# Patient Record
Sex: FEMALE | Race: AMERICAN INDIAN OR ALASKA NATIVE
[De-identification: names, ages, dates, MRNs, and addresses within clinical notes are randomized per-mention and may not be internally consistent; named-entity substitution may affect disease eponyms.]

---

## 2017-07-16 ENCOUNTER — HOSPITAL ENCOUNTER (EMERGENCY)
Dept: HOSPITAL 5 - ED | Age: 67
Discharge: HOME | End: 2017-07-16
Payer: MEDICARE

## 2017-07-16 VITALS — DIASTOLIC BLOOD PRESSURE: 92 MMHG | SYSTOLIC BLOOD PRESSURE: 177 MMHG

## 2017-07-16 DIAGNOSIS — F17.200: ICD-10-CM

## 2017-07-16 DIAGNOSIS — M25.552: ICD-10-CM

## 2017-07-16 DIAGNOSIS — I10: ICD-10-CM
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
## 2017-07-16 DIAGNOSIS — M25.462: Primary | ICD-10-CM
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
## 2017-07-16 PROCEDURE — 72100 X-RAY EXAM L-S SPINE 2/3 VWS: CPT

## 2017-07-16 NOTE — XRAY REPORT
FINAL REPORT



PROCEDURE:  XR FEMUR 2 LT



TECHNIQUE:  Four views left femur



HISTORY:  leg pain, fall 



COMPARISON:  No prior studies are available for comparison.



FINDINGS:  

Prior surgical change at the proximal left femur with lateral

compression plate and 6 screws. Moderate degenerative changes of

the left hip. Moderate to severe degenerative changes of the left

knee evident. Large suprapatellar effusion. The superior inferior

pole patellar osteophytic projections.



IMPRESSION:  

No acute fracture seen
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
## 2017-07-16 NOTE — XRAY REPORT
LUMBOSACRAL SPINE, 3 VIEWS:



History: Back pain



Findings: The vertebral bodies, disk spaces and posterior elements are 

intact.  No compression deformity or malalignment.  The SI joints are 

symmetric and unremarkable. There is moderate multilevel degenerative 

disc disease and facet arthropathy. 7 cm calcified uterine fibroid is 

noted.



Impression:

Lumbar spondylosis. No evidence for acute injury to the lumbar spine.

## 2017-07-16 NOTE — XRAY REPORT
LEFT HIP, 2 views:



History: Pain.



The bony architecture is intact without evidence of fracture or

dislocation.  No significant soft tissue abnormality is seen.



IMPRESSION:

Normal left hip.

## 2017-07-16 NOTE — XRAY REPORT
LEFT KNEE, 3 views:



History: Pain.



The bony architecture is intact without evidence of fracture or

dislocation.  There are moderate osteoarthritic changes. A large joint 

effusion is noted on the lateral image.



IMPRESSION:

Osteoarthritic changes. Large joint effusion.

## 2017-07-16 NOTE — XRAY REPORT
LEFT ANKLE, 3 views:



History: Pain after fall.



Bone mineralization is normal.  No acute osseous abnormality or joint 

pathology is identified.  The soft tissues are unremarkable.



IMPRESSION:

No acute injury identified.

## 2018-05-16 ENCOUNTER — HOSPITAL ENCOUNTER (EMERGENCY)
Dept: HOSPITAL 5 - ED | Age: 68
LOS: 1 days | Discharge: HOME | End: 2018-05-17
Payer: MEDICARE

## 2018-05-16 DIAGNOSIS — I16.0: Primary | ICD-10-CM

## 2018-05-16 DIAGNOSIS — R91.1: ICD-10-CM

## 2018-05-16 DIAGNOSIS — I10: ICD-10-CM

## 2018-05-16 DIAGNOSIS — F17.200: ICD-10-CM

## 2018-05-16 LAB
BASOPHILS # (AUTO): 0 K/MM3 (ref 0–0.1)
BASOPHILS NFR BLD AUTO: 0.4 % (ref 0–1.8)
BUN SERPL-MCNC: 9 MG/DL (ref 7–17)
BUN/CREAT SERPL: 13 %
CALCIUM SERPL-MCNC: 8.7 MG/DL (ref 8.4–10.2)
EOSINOPHIL # BLD AUTO: 0.1 K/MM3 (ref 0–0.4)
EOSINOPHIL NFR BLD AUTO: 1.5 % (ref 0–4.3)
HCT VFR BLD CALC: 40.1 % (ref 30.3–42.9)
HEMOLYSIS INDEX: 0
HGB BLD-MCNC: 13.3 GM/DL (ref 10.1–14.3)
LYMPHOCYTES # BLD AUTO: 1.4 K/MM3 (ref 1.2–5.4)
LYMPHOCYTES NFR BLD AUTO: 18.9 % (ref 13.4–35)
MCH RBC QN AUTO: 28 PG (ref 28–32)
MCHC RBC AUTO-ENTMCNC: 33 % (ref 30–34)
MCV RBC AUTO: 85 FL (ref 79–97)
MONOCYTES # (AUTO): 0.6 K/MM3 (ref 0–0.8)
MONOCYTES % (AUTO): 8.1 % (ref 0–7.3)
PLATELET # BLD: 174 K/MM3 (ref 140–440)
RBC # BLD AUTO: 4.7 M/MM3 (ref 3.65–5.03)

## 2018-05-16 PROCEDURE — 80048 BASIC METABOLIC PNL TOTAL CA: CPT

## 2018-05-16 PROCEDURE — 71275 CT ANGIOGRAPHY CHEST: CPT

## 2018-05-16 PROCEDURE — 93010 ELECTROCARDIOGRAM REPORT: CPT

## 2018-05-16 PROCEDURE — 93005 ELECTROCARDIOGRAM TRACING: CPT

## 2018-05-16 PROCEDURE — 36415 COLL VENOUS BLD VENIPUNCTURE: CPT

## 2018-05-16 PROCEDURE — 85025 COMPLETE CBC W/AUTO DIFF WBC: CPT

## 2018-05-16 PROCEDURE — 99284 EMERGENCY DEPT VISIT MOD MDM: CPT

## 2018-05-16 PROCEDURE — 84484 ASSAY OF TROPONIN QUANT: CPT

## 2018-05-16 PROCEDURE — 96374 THER/PROPH/DIAG INJ IV PUSH: CPT
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
## 2018-05-17 VITALS — DIASTOLIC BLOOD PRESSURE: 111 MMHG | SYSTOLIC BLOOD PRESSURE: 199 MMHG

## 2018-05-17 NOTE — CAT SCAN REPORT
FINAL REPORT



EXAM:  CT ANGIO CHEST



HISTORY:  Chest pain. History of DVT.



TECHNIQUE:  CT angiogram of the chest was performed, with axial

images obtained after the intravenous administration of contrast.

Sagittal, coronal, and spiral 360 degree coronal CT-MIP

reformatted images were also obtained. No prior studies are

available for comparison.



FINDINGS:  

The heart is normal in size. There is mild dilatation of the

distal aortic arch and proximal descending thoracic aorta,

measuring 3.2 cm in diameter. This tapers to 2.9 cm in the

descending thoracic aorta at the level of the main pulmonary

artery. There is no aortic dissection. No filling defect is seen

in the central or proximal segmental pulmonary arteries to

suggest pulmonary embolus. There are subcentimeter prevascular,

right paratracheal, and subcarinal lymph nodes. There is

mild-to-moderate bilateral perihilar and suprahilar soft tissue,

most likely reactive/post inflammatory changes. 



There is an ill-defined 1.6 cm low attenuating nodule in the

right thyroid midpole. There is diffuse heterogeneity of the

parenchyma throughout both lobes, with additional subcentimeter

low attenuating nodules in the right upper and right lower poles.





Examination of the lung parenchyma demonstrates multiple

scattered faint 1-2 mm patchy nodular densities along the

periphery throughout the bilateral lung fields, especially in the

left upper lobe. These are nonspecific, though the appearance

favors inflammatory/infectious process. There is additional

minimal subpleural reticular interstitial lung disease. There is

a 3 mm nodule in the posterior right lower lobe (series 2, image

82). There is moderate linear scarring at the right lung apex.

There are superimposed mild dependent changes. There is no jules

infiltrate. There is no pleural or pericardial effusion. The

trachea and visualized proximal airways are patent. 



Moderate spondylotic changes are seen in the spine. There is a

nonspecific 1.2 cm ring-enhancing lesion in the medial aspect of

the spleen. Differential possibilities include infection

(including abscess), necrotic neoplasm, or metastatic disease.

There are lobulated low attenuating lesions in the liver,

measuring 2.2 cm in the posterior right lobe and 1.7 cm in the

inferior right lobe. The internal contents measure fluid density,

though the right posterior lesion demonstrates possible nodular

peripheral enhancement, which would suggest hemangioma. There is

an additional 8 mm low attenuating lesion in the left medial

hepatic lobe, too small to characterize. The gallbladder is not

discretely visualized. 



IMPRESSION:  

1. No pulmonary embolism seen in the central or proximal

segmental pulmonary arteries. 



2. 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

proximal descending thoracic aorta, measuring up to 3.2 cm in

diameter.



3. 3 mm nodule in the posterior right lower lobe. Additional

scattered faint 1-2 mm patchy nodular densities along the

periphery of bilateral lung fields, nonspecific, but favor

inflammatory/infectious process. Follow-up imaging in 3-6 months

is recommended, as metastatic disease is not totally excluded.



4. Diffuse heterogeneity of the thyroid parenchyma with 1.6 cm

low attenuating nodule in the right thyroid midpole, and

additional subcentimeter right sided nodules. Ultrasound

correlation is recommended. 



5. Nonspecific 1.2 cm ring-enhancing lesion in medial aspect of

the spleen. Additional indeterminate hepatic lesions as

described. Given these findings, MRI exam with and without

contrast is suggested for further evaluation.

## 2021-07-02 ENCOUNTER — HOSPITAL ENCOUNTER (EMERGENCY)
Dept: HOSPITAL 5 - ED | Age: 71
Discharge: HOME | End: 2021-07-02
Payer: MEDICARE

## 2021-07-02 VITALS — DIASTOLIC BLOOD PRESSURE: 72 MMHG | SYSTOLIC BLOOD PRESSURE: 157 MMHG

## 2021-07-02 DIAGNOSIS — Z79.899: ICD-10-CM

## 2021-07-02 DIAGNOSIS — Z86.73: ICD-10-CM

## 2021-07-02 DIAGNOSIS — Z90.49: ICD-10-CM

## 2021-07-02 DIAGNOSIS — I16.0: ICD-10-CM

## 2021-07-02 DIAGNOSIS — Z98.890: ICD-10-CM

## 2021-07-02 DIAGNOSIS — M51.36: Primary | ICD-10-CM

## 2021-07-02 LAB
ALBUMIN SERPL-MCNC: 4.1 G/DL (ref 3.9–5)
ALT SERPL-CCNC: 24 UNITS/L (ref 7–56)
BASOPHILS # (AUTO): 0.1 K/MM3 (ref 0–0.1)
BASOPHILS NFR BLD AUTO: 0.8 % (ref 0–1.8)
BUN SERPL-MCNC: 9 MG/DL (ref 7–17)
BUN/CREAT SERPL: 10 %
CALCIUM SERPL-MCNC: 8.8 MG/DL (ref 8.4–10.2)
EOSINOPHIL # BLD AUTO: 0.1 K/MM3 (ref 0–0.4)
EOSINOPHIL NFR BLD AUTO: 2.2 % (ref 0–4.3)
HCT VFR BLD CALC: 42.4 % (ref 30.3–42.9)
HEMOLYSIS INDEX: 6
HGB BLD-MCNC: 14.2 GM/DL (ref 10.1–14.3)
LYMPHOCYTES # BLD AUTO: 2.2 K/MM3 (ref 1.2–5.4)
LYMPHOCYTES NFR BLD AUTO: 32.6 % (ref 13.4–35)
MCHC RBC AUTO-ENTMCNC: 34 % (ref 30–34)
MCV RBC AUTO: 87 FL (ref 79–97)
MONOCYTES # (AUTO): 0.6 K/MM3 (ref 0–0.8)
MONOCYTES % (AUTO): 8.3 % (ref 0–7.3)
PLATELET # BLD: 187 K/MM3 (ref 140–440)
RBC # BLD AUTO: 4.85 M/MM3 (ref 3.65–5.03)

## 2021-07-02 PROCEDURE — 93005 ELECTROCARDIOGRAM TRACING: CPT

## 2021-07-02 PROCEDURE — 84484 ASSAY OF TROPONIN QUANT: CPT

## 2021-07-02 PROCEDURE — 85025 COMPLETE CBC W/AUTO DIFF WBC: CPT

## 2021-07-02 PROCEDURE — 80053 COMPREHEN METABOLIC PANEL: CPT

## 2021-07-02 PROCEDURE — 71046 X-RAY EXAM CHEST 2 VIEWS: CPT

## 2021-07-02 PROCEDURE — 36415 COLL VENOUS BLD VENIPUNCTURE: CPT
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
## 2021-07-08 NOTE — ELECTROCARDIOGRAPH REPORT
[facility]

                                       

Test Date:    2021               Test Time:    19:12:48

Pat Name:     REGLA MUSE             Department:   

Patient ID:   [id number]          Room:          

Gender:       F                        Technician:   

:          1950               Requested By: KAYLYNN CRUZ

Order Number: [id number]              Reading MD:   Newton Valdez

                                 Measurements

Intervals                              Axis          

Rate:         53                       P:            44

TN:           170                      QRS:          -12

QRSD:         95                       T:            18

QT:           482                                    

QTc:          453                                    

                           Interpretive Statements

Sinus bradycardia

No previous ECG available for comparison

Electronically Signed On 2021 10:43:22 EDT by Newton Valdez

## 2022-02-14 ENCOUNTER — HOSPITAL ENCOUNTER (EMERGENCY)
Dept: HOSPITAL 5 - ED | Age: 72
Discharge: HOME | End: 2022-02-14
Payer: MEDICARE

## 2022-02-14 VITALS — DIASTOLIC BLOOD PRESSURE: 82 MMHG | SYSTOLIC BLOOD PRESSURE: 153 MMHG

## 2022-02-14 DIAGNOSIS — Z90.49: ICD-10-CM

## 2022-02-14 DIAGNOSIS — E86.0: ICD-10-CM

## 2022-02-14 DIAGNOSIS — R00.2: ICD-10-CM

## 2022-02-14 DIAGNOSIS — R42: Primary | ICD-10-CM

## 2022-02-14 LAB
ALBUMIN SERPL-MCNC: 4.3 G/DL (ref 3.9–5)
ALT SERPL-CCNC: 17 UNITS/L (ref 7–56)
BASOPHILS # (AUTO): 0.1 K/MM3 (ref 0–0.1)
BASOPHILS NFR BLD AUTO: 1.8 % (ref 0–1.8)
BUN SERPL-MCNC: 10 MG/DL (ref 7–17)
BUN/CREAT SERPL: 10 %
CALCIUM SERPL-MCNC: 9.1 MG/DL (ref 8.4–10.2)
EOSINOPHIL # BLD AUTO: 0.1 K/MM3 (ref 0–0.4)
EOSINOPHIL NFR BLD AUTO: 1.2 % (ref 0–4.3)
HCT VFR BLD CALC: 47.9 % (ref 30.3–42.9)
HEMOLYSIS INDEX: 19
HGB BLD-MCNC: 15.1 GM/DL (ref 10.1–14.3)
LYMPHOCYTES # BLD AUTO: 2.4 K/MM3 (ref 1.2–5.4)
LYMPHOCYTES NFR BLD AUTO: 36.6 % (ref 13.4–35)
MCHC RBC AUTO-ENTMCNC: 32 % (ref 30–34)
MCV RBC AUTO: 86 FL (ref 79–97)
MONOCYTES # (AUTO): 0.5 K/MM3 (ref 0–0.8)
MONOCYTES % (AUTO): 7.4 % (ref 0–7.3)
MUCOUS THREADS #/AREA URNS HPF: (no result) /HPF
PLATELET # BLD: 214 K/MM3 (ref 140–440)
RBC # BLD AUTO: 5.57 M/MM3 (ref 3.65–5.03)
RBC #/AREA URNS HPF: 1 /HPF (ref 0–6)

## 2022-02-14 PROCEDURE — 87086 URINE CULTURE/COLONY COUNT: CPT

## 2022-02-14 PROCEDURE — 99284 EMERGENCY DEPT VISIT MOD MDM: CPT

## 2022-02-14 PROCEDURE — 93010 ELECTROCARDIOGRAM REPORT: CPT

## 2022-02-14 PROCEDURE — 80053 COMPREHEN METABOLIC PANEL: CPT

## 2022-02-14 PROCEDURE — 81001 URINALYSIS AUTO W/SCOPE: CPT

## 2022-02-14 PROCEDURE — 36415 COLL VENOUS BLD VENIPUNCTURE: CPT

## 2022-02-14 PROCEDURE — 93005 ELECTROCARDIOGRAM TRACING: CPT

## 2022-02-14 PROCEDURE — 84484 ASSAY OF TROPONIN QUANT: CPT

## 2022-02-14 PROCEDURE — 71046 X-RAY EXAM CHEST 2 VIEWS: CPT

## 2022-02-14 PROCEDURE — 83880 ASSAY OF NATRIURETIC PEPTIDE: CPT

## 2022-02-14 PROCEDURE — 85025 COMPLETE CBC W/AUTO DIFF WBC: CPT

## 2022-02-14 PROCEDURE — 96360 HYDRATION IV INFUSION INIT: CPT

## 2022-02-15 LAB
PH UR STRIP: 6 [PH] (ref 5–7)
PROT UR STRIP-MCNC: (no result) MG/DL
UROBILINOGEN UR-MCNC: < 2 MG/DL (ref ?–2)
WBC #/AREA URNS HPF: 4 /HPF (ref 0–6)

## 2022-04-13 ENCOUNTER — HOSPITAL ENCOUNTER (EMERGENCY)
Dept: HOSPITAL 5 - ED | Age: 72
Discharge: HOME | End: 2022-04-13
Payer: MEDICARE

## 2022-04-13 VITALS — DIASTOLIC BLOOD PRESSURE: 77 MMHG | SYSTOLIC BLOOD PRESSURE: 132 MMHG

## 2022-04-13 DIAGNOSIS — Z86.718: ICD-10-CM

## 2022-04-13 DIAGNOSIS — N93.9: Primary | ICD-10-CM

## 2022-04-13 DIAGNOSIS — Z91.09: ICD-10-CM

## 2022-04-13 DIAGNOSIS — I10: ICD-10-CM

## 2022-04-13 DIAGNOSIS — Z86.73: ICD-10-CM

## 2022-04-13 LAB
ALBUMIN SERPL-MCNC: 3.9 G/DL (ref 3.9–5)
ALT SERPL-CCNC: 20 UNITS/L (ref 7–56)
BASOPHILS # (AUTO): 0.1 K/MM3 (ref 0–0.1)
BASOPHILS NFR BLD AUTO: 0.9 % (ref 0–1.8)
BILIRUB UR QL STRIP: (no result)
BLOOD UR QL VISUAL: (no result)
BUN SERPL-MCNC: 12 MG/DL (ref 7–17)
BUN/CREAT SERPL: 11 %
CALCIUM SERPL-MCNC: 9 MG/DL (ref 8.4–10.2)
EOSINOPHIL # BLD AUTO: 0.2 K/MM3 (ref 0–0.4)
EOSINOPHIL NFR BLD AUTO: 3.4 % (ref 0–4.3)
HCT VFR BLD CALC: 41.6 % (ref 30.3–42.9)
HEMOLYSIS INDEX: 12
HGB BLD-MCNC: 13.5 GM/DL (ref 10.1–14.3)
LYMPHOCYTES # BLD AUTO: 1.9 K/MM3 (ref 1.2–5.4)
LYMPHOCYTES NFR BLD AUTO: 31.9 % (ref 13.4–35)
MCHC RBC AUTO-ENTMCNC: 33 % (ref 30–34)
MCV RBC AUTO: 86 FL (ref 79–97)
MONOCYTES # (AUTO): 0.5 K/MM3 (ref 0–0.8)
MONOCYTES % (AUTO): 8.7 % (ref 0–7.3)
MUCOUS THREADS #/AREA URNS HPF: (no result) /HPF
PH UR STRIP: 5 [PH] (ref 5–7)
PLATELET # BLD: 176 K/MM3 (ref 140–440)
PROT UR STRIP-MCNC: (no result) MG/DL
RBC # BLD AUTO: 4.83 M/MM3 (ref 3.65–5.03)
RBC #/AREA URNS HPF: < 1 /HPF (ref 0–6)
UROBILINOGEN UR-MCNC: < 2 MG/DL (ref ?–2)
WBC #/AREA URNS HPF: < 1 /HPF (ref 0–6)

## 2022-04-13 PROCEDURE — 85025 COMPLETE CBC W/AUTO DIFF WBC: CPT

## 2022-04-13 PROCEDURE — 81001 URINALYSIS AUTO W/SCOPE: CPT

## 2022-04-13 PROCEDURE — 71046 X-RAY EXAM CHEST 2 VIEWS: CPT

## 2022-04-13 PROCEDURE — 36415 COLL VENOUS BLD VENIPUNCTURE: CPT

## 2022-04-13 PROCEDURE — 80053 COMPREHEN METABOLIC PANEL: CPT

## 2022-04-13 PROCEDURE — 99284 EMERGENCY DEPT VISIT MOD MDM: CPT

## 2022-04-13 PROCEDURE — 84484 ASSAY OF TROPONIN QUANT: CPT

## 2022-04-13 PROCEDURE — 93005 ELECTROCARDIOGRAM TRACING: CPT

## 2022-04-13 NOTE — XRAY REPORT
CHEST 2 VIEWS 



INDICATION / CLINICAL INFORMATION: Chest Pain.



COMPARISON: 02/14/22.



FINDINGS:



SUPPORT DEVICES: None.

HEART / MEDIASTINUM: The heart size and pulmonary vasculature are normal. There is mild aortic tortuo
sity without aneurysm. 

LUNGS / PLEURA: No significant pulmonary or pleural abnormality. No pneumothorax. 



ADDITIONAL FINDINGS: Chronic right rib deformities are stable.



IMPRESSION: No acute abnormality or significant change.



Signer Name: Manuel Bello MD 

Signed: 4/13/2022 1:32 PM

Workstation Name: [email or web address]
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
## 2022-04-14 NOTE — ELECTROCARDIOGRAPH REPORT
[facility]

                                       

Test Date:    2022               Test Time:    12:08:42

Pat Name:     REGLA MUSE             Department:   

Patient ID:   [id number]          Room:          

Gender:       F                        Technician:   KAT

:          1950               Requested By: KELI BARNETT

Order Number: [id number]              Reading MD:   Pietro Leroy

                                 Measurements

Intervals                              Axis          

Rate:         67                       P:            62

VA:           184                      QRS:          -10

QRSD:         96                       T:            54

QT:           421                                    

QTc:          447                                    

                           Interpretive Statements

Sinus rhythm

Compared to ECG 2022 16:46:45

No significant change

Electronically Signed On 2022 20:21:51 EDT by Pietro Leroy

## 2022-06-28 ENCOUNTER — HOSPITAL ENCOUNTER (EMERGENCY)
Dept: HOSPITAL 5 - ED | Age: 72
LOS: 1 days | Discharge: HOME | End: 2022-06-29
Payer: OTHER GOVERNMENT

## 2022-06-28 DIAGNOSIS — G89.29: ICD-10-CM

## 2022-06-28 DIAGNOSIS — Z98.890: ICD-10-CM

## 2022-06-28 DIAGNOSIS — Z86.73: ICD-10-CM

## 2022-06-28 DIAGNOSIS — R07.9: Primary | ICD-10-CM

## 2022-06-28 DIAGNOSIS — E87.6: ICD-10-CM

## 2022-06-28 DIAGNOSIS — I82.409: ICD-10-CM

## 2022-06-28 DIAGNOSIS — I10: ICD-10-CM

## 2022-06-28 DIAGNOSIS — M54.9: ICD-10-CM

## 2022-06-28 LAB
ALBUMIN SERPL-MCNC: 4.4 G/DL (ref 3.9–5)
ALT SERPL-CCNC: 12 UNITS/L (ref 7–56)
BASOPHILS # (AUTO): 0 K/MM3 (ref 0–0.1)
BASOPHILS NFR BLD AUTO: 0.6 % (ref 0–1.8)
BUN SERPL-MCNC: 9 MG/DL (ref 7–17)
BUN/CREAT SERPL: 8 %
CALCIUM SERPL-MCNC: 9.6 MG/DL (ref 8.4–10.2)
EOSINOPHIL # BLD AUTO: 0 K/MM3 (ref 0–0.4)
EOSINOPHIL NFR BLD AUTO: 0.6 % (ref 0–4.3)
HCT VFR BLD CALC: 47 % (ref 30.3–42.9)
HEMOLYSIS INDEX: 5
HGB BLD-MCNC: 15.2 GM/DL (ref 10.1–14.3)
LYMPHOCYTES # BLD AUTO: 1.5 K/MM3 (ref 1.2–5.4)
LYMPHOCYTES NFR BLD AUTO: 22.4 % (ref 13.4–35)
MCHC RBC AUTO-ENTMCNC: 32 % (ref 30–34)
MCV RBC AUTO: 86 FL (ref 79–97)
MONOCYTES # (AUTO): 0.4 K/MM3 (ref 0–0.8)
MONOCYTES % (AUTO): 5.8 % (ref 0–7.3)
PLATELET # BLD: 196 K/MM3 (ref 140–440)
RBC # BLD AUTO: 5.48 M/MM3 (ref 3.65–5.03)

## 2022-06-28 PROCEDURE — 85025 COMPLETE CBC W/AUTO DIFF WBC: CPT

## 2022-06-28 PROCEDURE — 93005 ELECTROCARDIOGRAM TRACING: CPT

## 2022-06-28 PROCEDURE — 71045 X-RAY EXAM CHEST 1 VIEW: CPT

## 2022-06-28 PROCEDURE — 83735 ASSAY OF MAGNESIUM: CPT

## 2022-06-28 PROCEDURE — 81001 URINALYSIS AUTO W/SCOPE: CPT

## 2022-06-28 PROCEDURE — 85379 FIBRIN DEGRADATION QUANT: CPT

## 2022-06-28 PROCEDURE — 84484 ASSAY OF TROPONIN QUANT: CPT

## 2022-06-28 PROCEDURE — 99284 EMERGENCY DEPT VISIT MOD MDM: CPT

## 2022-06-28 PROCEDURE — 36415 COLL VENOUS BLD VENIPUNCTURE: CPT

## 2022-06-28 PROCEDURE — 80053 COMPREHEN METABOLIC PANEL: CPT

## 2022-06-28 NOTE — XRAY REPORT
CHEST 1 VIEW



INDICATION: 

CHEST PAIN.



COMPARISON: 

4/13/2022.



FINDINGS:



Support devices: None.

Heart: Normal. 

Lungs/Pleura: There are low lung volumes. Mild interstitial opacities are noted, this may be due to l
ow lung volumes. No consolidation or effusion. No pneumothorax.  







IMPRESSION:

1. No acute findings.



Signer Name: Kj Sommers MD 

Signed: 6/28/2022 7:42 PM

Workstation Name: [id number]

## 2022-06-29 VITALS — DIASTOLIC BLOOD PRESSURE: 82 MMHG | SYSTOLIC BLOOD PRESSURE: 153 MMHG

## 2022-06-29 LAB
BILIRUB UR QL STRIP: (no result)
BLOOD UR QL VISUAL: (no result)
PH UR STRIP: 5 [PH] (ref 5–7)
RBC #/AREA URNS HPF: < 1 /HPF (ref 0–6)
UROBILINOGEN UR-MCNC: < 2 MG/DL (ref ?–2)
WBC #/AREA URNS HPF: 6 /HPF (ref 0–6)

## 2022-06-29 NOTE — ELECTROCARDIOGRAPH REPORT
[facility]

                                       

Test Date:    2022               Test Time:    19:13:36

Pat Name:     REGLA MUSE             Department:   

Patient ID:   [id number]          Room:          

Gender:       F                        Technician:   MB

:          1950               Requested By: YVETTE SMITH

Order Number: [id number]              Reading MD:   Newton Valdez

                                 Measurements

Intervals                              Axis          

Rate:         84                       P:            41

ID:           [id number]                      QRS:          -8

QRSD:         99                       T:            73

QT:           399                                    

QTc:          471                                    

                           Interpretive Statements

Sinus rhythm

Supraventricular bigeminy

Low voltage, precordial leads

Compared to ECG 2022 12:08:42

Atrial premature complex(es) now present

Low QRS voltage now present

Electronically Signed On 2022 9:49:50 EDT by Newton Valdez

## 2022-06-29 NOTE — ELECTROCARDIOGRAPH REPORT
[facility]

                                       

Test Date:    2022               Test Time:    03:34:51

Pat Name:     REGLA MUSE             Department:   

Patient ID:   [id number]          Room:          

Gender:       F                        Technician:   LUIZ

:          1950               Requested By: YVETTE SMITH

Order Number: [id number]              Reading MD:   Newton Valdez

                                 Measurements

Intervals                              Axis          

Rate:         84                       P:            66

DE:           170                      QRS:          -15

QRSD:         92                       T:            26

QT:           419                                    

QTc:          494                                    

                           Interpretive Statements

Sinus rhythm

Compared to ECG 2022 19:13:36

Atrial premature complex(es) no longer present

Electronically Signed On 2022 9:55:42 EDT by Newton Valdez
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
